# Patient Record
Sex: MALE | Race: WHITE | NOT HISPANIC OR LATINO | Employment: FULL TIME | URBAN - METROPOLITAN AREA
[De-identification: names, ages, dates, MRNs, and addresses within clinical notes are randomized per-mention and may not be internally consistent; named-entity substitution may affect disease eponyms.]

---

## 2017-09-03 ENCOUNTER — OFFICE VISIT (OUTPATIENT)
Dept: URGENT CARE | Facility: URGENT CARE | Age: 37
End: 2017-09-03
Payer: COMMERCIAL

## 2017-09-03 VITALS
TEMPERATURE: 97.7 F | WEIGHT: 210 LBS | DIASTOLIC BLOOD PRESSURE: 80 MMHG | HEART RATE: 63 BPM | SYSTOLIC BLOOD PRESSURE: 134 MMHG | OXYGEN SATURATION: 97 %

## 2017-09-03 DIAGNOSIS — S90.862A INFECTED INSECT BITE OF FOOT, LEFT, INITIAL ENCOUNTER: Primary | ICD-10-CM
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
## 2017-09-03 DIAGNOSIS — L08.9 INFECTED INSECT BITE OF FOOT, LEFT, INITIAL ENCOUNTER: Primary | ICD-10-CM

## 2017-09-03 PROCEDURE — 99203 OFFICE O/P NEW LOW 30 MIN: CPT | Performed by: FAMILY MEDICINE

## 2017-09-03 RX ORDER — METHYLPREDNISOLONE 4 MG
4 TABLET, DOSE PACK ORAL SEE ADMIN INSTRUCTIONS
Qty: 21 TABLET | Refills: 0 | Status: SHIPPED | OUTPATIENT
Start: 2017-09-03

## 2017-09-03 RX ORDER — TRIAMCINOLONE ACETONIDE 1 MG/G
CREAM TOPICAL 2 TIMES DAILY
Qty: 80 G | Refills: 1 | Status: SHIPPED | OUTPATIENT
Start: 2017-09-03

## 2017-09-03 RX ORDER — CEPHALEXIN 500 MG/1
500 CAPSULE ORAL 3 TIMES DAILY
Qty: 30 CAPSULE | Refills: 0 | Status: SHIPPED | OUTPATIENT
Start: 2017-09-03

## 2017-09-03 ASSESSMENT — PAIN SCALES - GENERAL: PAINLEVEL: EXTREME PAIN (8)

## 2017-09-03 NOTE — MR AVS SNAPSHOT
After Visit Summary   9/3/2017    Ron House    MRN: [id number]           Patient Information     Date Of Birth          1980        Visit Information        Provider Department      9/3/2017 7:00 PM Linnea Vega MD [facility]        Today's Diagnoses     Infected insect bite of foot, left, initial encounter    -  1      Care Instructions      Infected Insect Bite or Sting    When an insect stings you, it injects venom. When an insect bites you, it does not. Stings and bites may cause a local reaction. Or they may cause a reaction that affects your whole body. Bites and stings may become infected. Signs of infection include redness, warmth, pain, drainage of pus, and swelling. Infections will need treatment with antibiotics and should get better over the next 10 days.  Home care  The following will help you care for your bite or sting at home:    If a stinger is still in your skin, it will need to be removed. Don't use tweezers. Gently scrape the stinger from the side with a firm object such as the side of a credit card. This will loosen it and remove it from your skin.    If itching is a problem, applying ice packs to the sting area will help.    Wash the area with soap and water at least 3 times a day. Apply a topical antibiotic cream or ointment.    You can use an over-the counter antihistamine unless your doctor has given you a prescription antihistamine. You may use antihistamines to reduce itching if large areas of the skin are involved. Use lower doses during the daytime and higher doses at bedtime since the drug may make you sleepy. Don't use an antihistamine if you have glaucoma or if you are a man with trouble urinating due to an enlarged prostate. Some antihistamines cause less drowsiness and are a good choice for daytime use.    If oral antibiotics have been prescribed, be sure to take them as directed until they are all finished.    You may  use over-the-counter pain medicine to control pain, unless another pain medicine was prescribed. Talk with your doctor before using acetaminophen or ibuprofen if you have chronic liver or kidney disease. Also talk with your doctor if you have ever had a stomach ulcer or gastrointestinal bleeding.  Follow-up care  Follow up with your healthcare provider, or as advised if you don't get better over the next 2 days or if your symptoms get worse.  Call 911  Call 911 if any of these occur:    Swelling of the face, eyelids, mouth, throat, or tongue    Difficulty swallowing or breathing  When to seek medical advice  Call your healthcare provider right away if any of these occur:    Spreading areas of redness or swelling    Fever of 100.4 F (38 C) or higher, or as directed by your healthcare provider    Increased local pain    Headache, fever, chills, muscle or joint aching, or vomiting,    New rash  Date Last Reviewed: 10/1/2016    3646-1082 [employer]. [address] [place] [address]. All rights reserved. This information is not intended as a substitute for professional medical care. Always follow your healthcare professional's instructions.                Follow-ups after your visit        Who to contact     If you have questions or need follow up information about today's clinic visit or your schedule please contact [facility] directly at [phone].  Normal or non-critical lab and imaging results will be communicated to you by MyChart, letter or phone within 4 business days after the clinic has received the results. If you do not hear from us within 7 days, please contact the clinic through MyChart or phone. If you have a critical or abnormal lab result, we will notify you by phone as soon as possible.  Submit refill requests through [employer] or call your pharmacy and they will forward the refill request to us. Please allow 3 business days for your refill to be  "completed.          Additional Information About Your Visit        [employer] Information     [employer] lets you send messages to your doctor, view your test results, renew your prescriptions, schedule appointments and more. To sign up, go to [email or web address] . Click on \"Log in\" on the left side of the screen, which will take you to the Welcome page. Then click on \"Sign up Now\" on the right side of the page.     You will be asked to enter the access code listed below, as well as some personal information. Please follow the directions to create your username and password.     Your access code is: [email or web address]  Expires: 2017  7:21 PM     Your access code will  in 90 days. If you need help or a new code, please call your [facility] clinic or [phone].        Care EveryWhere ID     This is your Care EveryWhere ID. This could be used by other organizations to access your [facility] medical records  [id number]        Your Vitals Were     Pulse Temperature Pulse Oximetry             63 97.7  F (36.5  C) (Oral) 97%          Blood Pressure from Last 3 Encounters:   17 134/80   14 130/60    Weight from Last 3 Encounters:   17 210 lb (95.3 kg)   14 203 lb 1.6 oz (92.1 kg)              Today, you had the following     No orders found for display         Today's Medication Changes          These changes are accurate as of: 9/3/17  7:21 PM.  If you have any questions, ask your nurse or doctor.               Start taking these medicines.        Dose/Directions    methylPREDNISolone 4 MG tablet   Commonly known as:  MEDROL   Used for:  Infected insect bite of foot, left, initial encounter   Started by:  Linnea Vega MD        Dose:  4 mg   Take 1 tablet (4 mg) by mouth See Admin Instructions follow package directions   Quantity:  21 tablet   Refills:  0       triamcinolone 0.1 % cream   Commonly known as:  KENALOG   Used for:  Infected insect bite of foot, left, initial encounter "   Started by:  Linnea Vega MD        Apply topically 2 times daily   Quantity:  80 g   Refills:  1         These medicines have changed or have updated prescriptions.        Dose/Directions    * cephALEXin 500 MG capsule   Commonly known as:  KEFLEX   This may have changed:  Another medication with the same name was added. Make sure you understand how and when to take each.   Used for:  Paronychia of toe, right   Changed by:  Samuel Lacey MD        Dose:  500 mg   Take 1 capsule (500 mg) by mouth 3 times daily   Quantity:  30 capsule   Refills:  0       * cephALEXin 500 MG capsule   Commonly known as:  KEFLEX   This may have changed:  You were already taking a medication with the same name, and this prescription was added. Make sure you understand how and when to take each.   Used for:  Infected insect bite of foot, left, initial encounter   Changed by:  Linnea Vega MD        Dose:  500 mg   Take 1 capsule (500 mg) by mouth 3 times daily   Quantity:  30 capsule   Refills:  0       * Notice:  This list has 2 medication(s) that are the same as other medications prescribed for you. Read the directions carefully, and ask your doctor or other care provider to review them with you.         Where to get your medicines      These medications were sent to [facility] [id number] [place] [address] [address] AT [facility] [address], [facility] [address]    Hours:  24-hours Phone:  [phone]     cephALEXin 500 MG capsule    methylPREDNISolone 4 MG tablet    triamcinolone 0.1 % cream                Primary Care Provider    [facility] None       No address on file        Equal Access to Services     [facility] AH: Hadii tyler ku hadasho Sojaniceali, waaxda luqadaha, qaybta kaalmada adeegyada, waxay kaity vargas. So [facility] [phone].    ATENCIÓN: Si habla español, tiene a sahni disposición servicios gratuitos de asistencia lingüística. Llame al  [phone].    We comply with applicable federal civil rights laws and [place] laws. We do not discriminate on the basis of race, color, national origin, age, disability sex, sexual orientation or gender identity.            Thank you!     Thank you for choosing [facility]  for your care. Our goal is always to provide you with excellent care. Hearing back from our patients is one way we can continue to improve our services. Please take a few minutes to complete the written survey that you may receive in the mail after your visit with us. Thank you!             Your Updated Medication List - Protect others around you: Learn how to safely use, store and throw away your medicines at www.disposemymeds.org.          This list is accurate as of: 9/3/17  7:21 PM.  Always use your most recent med list.                   Brand Name Dispense Instructions for use Diagnosis    * cephALEXin 500 MG capsule    KEFLEX    30 capsule    Take 1 capsule (500 mg) by mouth 3 times daily    Paronychia of toe, right       * cephALEXin 500 MG capsule    KEFLEX    30 capsule    Take 1 capsule (500 mg) by mouth 3 times daily    Infected insect bite of foot, left, initial encounter       methylPREDNISolone 4 MG tablet    MEDROL    21 tablet    Take 1 tablet (4 mg) by mouth See Admin Instructions follow package directions    Infected insect bite of foot, left, initial encounter       triamcinolone 0.1 % cream    KENALOG    80 g    Apply topically 2 times daily    Infected insect bite of foot, left, initial encounter       * Notice:  This list has 2 medication(s) that are the same as other medications prescribed for you. Read the directions carefully, and ask your doctor or other care provider to review them with you.

## 2017-09-04 NOTE — NURSING NOTE
Chief Complaint   Patient presents with     Foot Pain     swelling with pain of left foot from a bee sting yesterday.        Initial /80  Pulse 63  Temp 97.7  F (36.5  C) (Oral)  Wt 210 lb (95.3 kg)  SpO2 97% There is no height or weight on file to calculate BMI.  Medication Reconciliation: complete

## 2017-09-04 NOTE — PROGRESS NOTES
SUBJECTIVE:  Chief Complaint   Patient presents with     Foot Pain     swelling with pain of left foot from a bee sting yesterday.      Ron House is a 37 year old male who presents with a chief complaint of an insect bite on the left  foot.   Was bitten by a bee yesterday.    Severity: moderate.    Associated symptoms: immediate pain, painful or inability to bear weight directly after injury, redness noted, increasing pain , swelling and edema at site  No problems of wheezing, mouth/ throat swelling, shortness of breath.  No hives  No fevers or chills         PMH:  Exaggerated swelling response to bee stings  paronychia    ALLERGIES:  Review of patient's allergies indicates no known allergies.      Current Outpatient Prescriptions on File Prior to Visit:  cephALEXin (KEFLEX) 500 MG capsule Take 1 capsule (500 mg) by mouth 3 times daily (Patient not taking: Reported on 9/3/2017)     No current facility-administered medications on file prior to visit.     Social History   Substance Use Topics     Smoking status: Never Smoker     Smokeless tobacco: Never Used     Alcohol use Not on file   Works at a group home    No family history on file.    ROS:  CONSTITUTIONAL:NEGATIVE for fever, chills, change in weight  EYES: NEGATIVE for vision changes or irritation  ENT/MOUTH: NEGATIVE for ear, mouth and throat problems  RESP:NEGATIVE for significant cough or SOB  GI: NEGATIVE for nausea, abdominal pain, heartburn, or change in bowel habits    OBJECTIVE:  /80  Pulse 63  Temp 97.7  F (36.5  C) (Oral)  Wt 210 lb (95.3 kg)  SpO2 97%  GENERAL APPEARANCE: alert, mild distress and cooperative  EYES: EOMI,  PERRL, conjunctiva clear  NECK: supple, non-tender to palpation, no adenopathy noted  Extremities:  Right leg - no peripheral edema or tenderness, peripheral pulses normal  SKIN: no suspicious lesions or rashes  NEURO: Normal strength and tone, sensory exam grossly normal,  normal speech and mentation    Left  foot  SKIN:  Extensive swelling and edema with tenderness, some warmth of the entire foot and extending to the bottom of the calf.  Pink color, pulses not palpable due to extensive edema  MS left foot: Movement of toes pain free and ankle pain free     MS:extremities normal- no gross deformities noted,  FROM noted in all extremities,  NEURO: Normal strength and tone, sensory exam grossly normal,  normal speech and mentation    ASSESSMENT:  Infected insect bite of foot, left, initial encounter     - methylPREDNISolone (MEDROL) 4 MG tablet; Take 1 tablet (4 mg) by mouth See Admin Instructions follow package directions  - cephALEXin (KEFLEX) 500 MG capsule; Take 1 capsule (500 mg) by mouth 3 times daily  - triamcinolone (KENALOG) 0.1 % cream; Apply topically 2 times daily     OTC antihistamine prn  Medrol dose pack   Cephalexin 500 mg po qid x 10 days-  Will only start antibiotic if ascending warmth, redness, streaking     Triamcinolone 0.1% cream apply twice daily for itching  Patient should return to UC or primary MD if worsening                                            /         .

## 2017-09-04 NOTE — PATIENT INSTRUCTIONS
Infected Insect Bite or Sting    When an insect stings you, it injects venom. When an insect bites you, it does not. Stings and bites may cause a local reaction. Or they may cause a reaction that affects your whole body. Bites and stings may become infected. Signs of infection include redness, warmth, pain, drainage of pus, and swelling. Infections will need treatment with antibiotics and should get better over the next 10 days.  Home care  The following will help you care for your bite or sting at home:    If a stinger is still in your skin, it will need to be removed. Don't use tweezers. Gently scrape the stinger from the side with a firm object such as the side of a credit card. This will loosen it and remove it from your skin.    If itching is a problem, applying ice packs to the sting area will help.    Wash the area with soap and water at least 3 times a day. Apply a topical antibiotic cream or ointment.    You can use an over-the counter antihistamine unless your doctor has given you a prescription antihistamine. You may use antihistamines to reduce itching if large areas of the skin are involved. Use lower doses during the daytime and higher doses at bedtime since the drug may make you sleepy. Don't use an antihistamine if you have glaucoma or if you are a man with trouble urinating due to an enlarged prostate. Some antihistamines cause less drowsiness and are a good choice for daytime use.    If oral antibiotics have been prescribed, be sure to take them as directed until they are all finished.    You may use over-the-counter pain medicine to control pain, unless another pain medicine was prescribed. Talk with your doctor before using acetaminophen or ibuprofen if you have chronic liver or kidney disease. Also talk with your doctor if you have ever had a stomach ulcer or gastrointestinal bleeding.  Follow-up care  Follow up with your healthcare provider, or as advised if you don't get better over the next  2 days or if your symptoms get worse.  Call 911  Call 911 if any of these occur:    Swelling of the face, eyelids, mouth, throat, or tongue    Difficulty swallowing or breathing  When to seek medical advice  Call your healthcare provider right away if any of these occur:    Spreading areas of redness or swelling    Fever of 100.4 F (38 C) or higher, or as directed by your healthcare provider    Increased local pain    Headache, fever, chills, muscle or joint aching, or vomiting,    New rash  Date Last Reviewed: 10/1/2016    6793-1077 [employer]. [address] [place], [place] [address]. All rights reserved. This information is not intended as a substitute for professional medical care. Always follow your healthcare professional's instructions.

## 2023-12-26 ENCOUNTER — HOSPITAL ENCOUNTER (EMERGENCY)
Facility: CLINIC | Age: 43
Discharge: HOME | End: 2023-12-26
Payer: COMMERCIAL

## 2023-12-26 VITALS
RESPIRATION RATE: 20 BRPM | OXYGEN SATURATION: 99 % | TEMPERATURE: 98.5 F | DIASTOLIC BLOOD PRESSURE: 74 MMHG | HEART RATE: 84 BPM | SYSTOLIC BLOOD PRESSURE: 133 MMHG

## 2023-12-26 DIAGNOSIS — B27.80 OTHER INFECTIOUS MONONUCLEOSIS WITHOUT COMPLICATION: ICD-10-CM

## 2023-12-26 LAB
FLUAV RNA SPEC QL NAA+PROBE: NEGATIVE
FLUBV RNA RESP QL NAA+PROBE: NEGATIVE
GROUP A STREP BY PCR: NOT DETECTED
MONOCYTES NFR BLD AUTO: POSITIVE %
RSV RNA SPEC NAA+PROBE: NEGATIVE
SARS-COV-2 RNA RESP QL NAA+PROBE: NEGATIVE

## 2023-12-26 PROCEDURE — 99283 EMERGENCY DEPT VISIT LOW MDM: CPT

## 2023-12-26 PROCEDURE — 87591 N.GONORRHOEAE DNA AMP PROB: CPT

## 2023-12-26 PROCEDURE — 250N000013 HC RX MED GY IP 250 OP 250 PS 637: Performed by: EMERGENCY MEDICINE

## 2023-12-26 PROCEDURE — 87491 CHLMYD TRACH DNA AMP PROBE: CPT

## 2023-12-26 PROCEDURE — 87637 SARSCOV2&INF A&B&RSV AMP PRB: CPT

## 2023-12-26 PROCEDURE — 86308 HETEROPHILE ANTIBODY SCREEN: CPT

## 2023-12-26 PROCEDURE — 250N000013 HC RX MED GY IP 250 OP 250 PS 637

## 2023-12-26 PROCEDURE — 87651 STREP A DNA AMP PROBE: CPT

## 2023-12-26 PROCEDURE — 36415 COLL VENOUS BLD VENIPUNCTURE: CPT

## 2023-12-26 RX ORDER — ACETAMINOPHEN 500 MG
1000 TABLET ORAL ONCE
Status: COMPLETED | OUTPATIENT
Start: 2023-12-26 | End: 2023-12-26

## 2023-12-26 RX ADMIN — Medication 10 MG: at 17:07

## 2023-12-26 RX ADMIN — ACETAMINOPHEN 1000 MG: 500 TABLET, FILM COATED ORAL at 15:37

## 2023-12-26 ASSESSMENT — ACTIVITIES OF DAILY LIVING (ADL)
ADLS_ACUITY_SCORE: 33
ADLS_ACUITY_SCORE: 35

## 2023-12-26 NOTE — ED TRIAGE NOTES
Pt. Presents to ED with persistent and worsening throat pain since Saturday. [facility] and he tested negative for strep but was put on Augmentin anyway. Pt. Reports he also has some SOB with the swelling and pain of his tonsils. Reports intermittent body aches. AVSS on RA. Reports he took 600 mg ibuprofen around 1430 for pain.      Triage Assessment (Adult)       Row Name 12/26/23 1531          Triage Assessment    Airway WDL WDL        Respiratory WDL    Respiratory WDL X;all     Rhythm/Pattern, Respiratory shortness of breath;depth regular;pattern regular;unlabored     Expansion/Accessory Muscles/Retractions expansion symmetric;no retractions;no use of accessory muscles     Nailbeds no discoloration        Skin Circulation/Temperature WDL    Skin Circulation/Temperature WDL WDL        Cardiac WDL    Cardiac WDL WDL        Peripheral/Neurovascular WDL    Peripheral Neurovascular WDL WDL        Cognitive/Neuro/Behavioral WDL    Cognitive/Neuro/Behavioral WDL WDL

## 2023-12-27 ENCOUNTER — TELEPHONE (OUTPATIENT)
Dept: OTOLARYNGOLOGY | Facility: CLINIC | Age: 43
End: 2023-12-27
Payer: COMMERCIAL

## 2023-12-27 LAB
C TRACH DNA SPEC QL PROBE+SIG AMP: NEGATIVE
N GONORRHOEA DNA SPEC QL NAA+PROBE: NEGATIVE

## 2024-04-28 ENCOUNTER — HEALTH MAINTENANCE LETTER (OUTPATIENT)
Age: 44
End: 2024-04-28

## 2025-05-17 ENCOUNTER — HEALTH MAINTENANCE LETTER (OUTPATIENT)
Age: 45
End: 2025-05-17